# Patient Record
Sex: MALE | Race: BLACK OR AFRICAN AMERICAN | NOT HISPANIC OR LATINO | Employment: FULL TIME | URBAN - METROPOLITAN AREA
[De-identification: names, ages, dates, MRNs, and addresses within clinical notes are randomized per-mention and may not be internally consistent; named-entity substitution may affect disease eponyms.]

---

## 2020-06-07 ENCOUNTER — HOSPITAL ENCOUNTER (EMERGENCY)
Facility: HOSPITAL | Age: 30
Discharge: HOME | End: 2020-06-08
Attending: EMERGENCY MEDICINE
Payer: MEDICAID

## 2020-06-07 DIAGNOSIS — R79.89 ELEVATED SERUM CREATININE: ICD-10-CM

## 2020-06-07 DIAGNOSIS — R50.9 ACUTE FEBRILE ILLNESS: Primary | ICD-10-CM

## 2020-06-07 DIAGNOSIS — R19.7 DIARRHEA, UNSPECIFIED TYPE: ICD-10-CM

## 2020-06-07 DIAGNOSIS — R51.9 FRONTAL HEADACHE: ICD-10-CM

## 2020-06-07 LAB
ALBUMIN SERPL BCP-MCNC: 3.9 G/DL (ref 3.5–5.2)
ALP SERPL-CCNC: 72 U/L (ref 55–135)
ALT SERPL W/O P-5'-P-CCNC: 25 U/L (ref 10–44)
ANION GAP SERPL CALC-SCNC: 10 MMOL/L (ref 8–16)
AST SERPL-CCNC: 29 U/L (ref 10–40)
BASOPHILS # BLD AUTO: 0.03 K/UL (ref 0–0.2)
BASOPHILS NFR BLD: 0.3 % (ref 0–1.9)
BILIRUB SERPL-MCNC: 0.4 MG/DL (ref 0.1–1)
BILIRUB UR QL STRIP: NEGATIVE
BUN SERPL-MCNC: 17 MG/DL (ref 6–20)
CALCIUM SERPL-MCNC: 9.5 MG/DL (ref 8.7–10.5)
CHLORIDE SERPL-SCNC: 105 MMOL/L (ref 95–110)
CLARITY UR: CLEAR
CO2 SERPL-SCNC: 24 MMOL/L (ref 23–29)
COLOR UR: YELLOW
CREAT SERPL-MCNC: 1.6 MG/DL (ref 0.5–1.4)
CRP SERPL-MCNC: 50.9 MG/L (ref 0–8.2)
DIFFERENTIAL METHOD: ABNORMAL
EOSINOPHIL # BLD AUTO: 0.1 K/UL (ref 0–0.5)
EOSINOPHIL NFR BLD: 1 % (ref 0–8)
ERYTHROCYTE [DISTWIDTH] IN BLOOD BY AUTOMATED COUNT: 13.5 % (ref 11.5–14.5)
EST. GFR  (AFRICAN AMERICAN): >60 ML/MIN/1.73 M^2
EST. GFR  (NON AFRICAN AMERICAN): 58 ML/MIN/1.73 M^2
GLUCOSE SERPL-MCNC: 81 MG/DL (ref 70–110)
GLUCOSE UR QL STRIP: NEGATIVE
HCT VFR BLD AUTO: 48.3 % (ref 40–54)
HGB BLD-MCNC: 16.1 G/DL (ref 14–18)
HGB UR QL STRIP: ABNORMAL
IMM GRANULOCYTES # BLD AUTO: 0.03 K/UL (ref 0–0.04)
IMM GRANULOCYTES NFR BLD AUTO: 0.3 % (ref 0–0.5)
KETONES UR QL STRIP: NEGATIVE
LACTATE SERPL-SCNC: 1.8 MMOL/L (ref 0.5–2.2)
LEUKOCYTE ESTERASE UR QL STRIP: NEGATIVE
LYMPHOCYTES # BLD AUTO: 1.1 K/UL (ref 1–4.8)
LYMPHOCYTES NFR BLD: 11.6 % (ref 18–48)
MCH RBC QN AUTO: 29.2 PG (ref 27–31)
MCHC RBC AUTO-ENTMCNC: 33.3 G/DL (ref 32–36)
MCV RBC AUTO: 88 FL (ref 82–98)
MICROSCOPIC COMMENT: NORMAL
MONOCYTES # BLD AUTO: 0.7 K/UL (ref 0.3–1)
MONOCYTES NFR BLD: 7.4 % (ref 4–15)
NEUTROPHILS # BLD AUTO: 7.8 K/UL (ref 1.8–7.7)
NEUTROPHILS NFR BLD: 79.4 % (ref 38–73)
NITRITE UR QL STRIP: NEGATIVE
NRBC BLD-RTO: 0 /100 WBC
PH UR STRIP: 5 [PH] (ref 5–8)
PLATELET # BLD AUTO: 193 K/UL (ref 150–350)
PMV BLD AUTO: 10.2 FL (ref 9.2–12.9)
POTASSIUM SERPL-SCNC: 4.4 MMOL/L (ref 3.5–5.1)
PROCALCITONIN SERPL IA-MCNC: 0.17 NG/ML
PROT SERPL-MCNC: 7.5 G/DL (ref 6–8.4)
PROT UR QL STRIP: NEGATIVE
RBC # BLD AUTO: 5.51 M/UL (ref 4.6–6.2)
RBC #/AREA URNS HPF: 2 /HPF (ref 0–4)
SARS-COV-2 RDRP RESP QL NAA+PROBE: NEGATIVE
SODIUM SERPL-SCNC: 139 MMOL/L (ref 136–145)
SP GR UR STRIP: 1.02 (ref 1–1.03)
URN SPEC COLLECT METH UR: ABNORMAL
UROBILINOGEN UR STRIP-ACNC: NEGATIVE EU/DL
WBC # BLD AUTO: 9.85 K/UL (ref 3.9–12.7)
WBC #/AREA URNS HPF: 4 /HPF (ref 0–5)

## 2020-06-07 PROCEDURE — 87040 BLOOD CULTURE FOR BACTERIA: CPT

## 2020-06-07 PROCEDURE — 25000003 PHARM REV CODE 250: Performed by: PHYSICIAN ASSISTANT

## 2020-06-07 PROCEDURE — 81000 URINALYSIS NONAUTO W/SCOPE: CPT

## 2020-06-07 PROCEDURE — U0002 COVID-19 LAB TEST NON-CDC: HCPCS

## 2020-06-07 PROCEDURE — 80053 COMPREHEN METABOLIC PANEL: CPT

## 2020-06-07 PROCEDURE — 84145 PROCALCITONIN (PCT): CPT

## 2020-06-07 PROCEDURE — 93010 ELECTROCARDIOGRAM REPORT: CPT | Mod: ,,, | Performed by: INTERNAL MEDICINE

## 2020-06-07 PROCEDURE — 96361 HYDRATE IV INFUSION ADD-ON: CPT

## 2020-06-07 PROCEDURE — 99285 EMERGENCY DEPT VISIT HI MDM: CPT | Mod: 25

## 2020-06-07 PROCEDURE — 93005 ELECTROCARDIOGRAM TRACING: CPT

## 2020-06-07 PROCEDURE — 83605 ASSAY OF LACTIC ACID: CPT

## 2020-06-07 PROCEDURE — 86140 C-REACTIVE PROTEIN: CPT

## 2020-06-07 PROCEDURE — 93010 EKG 12-LEAD: ICD-10-PCS | Mod: ,,, | Performed by: INTERNAL MEDICINE

## 2020-06-07 PROCEDURE — 96360 HYDRATION IV INFUSION INIT: CPT

## 2020-06-07 PROCEDURE — 85025 COMPLETE CBC W/AUTO DIFF WBC: CPT

## 2020-06-07 RX ORDER — ACETAMINOPHEN 500 MG
1000 TABLET ORAL
Status: COMPLETED | OUTPATIENT
Start: 2020-06-07 | End: 2020-06-07

## 2020-06-07 RX ORDER — IBUPROFEN 600 MG/1
600 TABLET ORAL
Status: COMPLETED | OUTPATIENT
Start: 2020-06-07 | End: 2020-06-07

## 2020-06-07 RX ADMIN — SODIUM CHLORIDE 2817 ML: 0.9 INJECTION, SOLUTION INTRAVENOUS at 10:06

## 2020-06-07 RX ADMIN — IBUPROFEN 600 MG: 600 TABLET, FILM COATED ORAL at 09:06

## 2020-06-07 RX ADMIN — ACETAMINOPHEN 1000 MG: 500 TABLET ORAL at 11:06

## 2020-06-08 VITALS
BODY MASS INDEX: 28.98 KG/M2 | HEIGHT: 71 IN | RESPIRATION RATE: 25 BRPM | SYSTOLIC BLOOD PRESSURE: 137 MMHG | DIASTOLIC BLOOD PRESSURE: 82 MMHG | OXYGEN SATURATION: 100 % | WEIGHT: 207 LBS | TEMPERATURE: 101 F | HEART RATE: 105 BPM

## 2020-06-08 NOTE — ED PROVIDER NOTES
Encounter Date: 6/7/2020       History     Chief Complaint   Patient presents with    Fever     Oral temp in triage is 102.8. [place] fine on yesterday but reports a home temp of 104 today accompanied with chills. Took Tylenol and Ibuprofen for the fever. Last took Tylenol 30minutes PTA in ED. Denies cough, n/v or SOB.      29-year-old male with history of HIV (undetectable) presents to the emergency department for acute onset of fever and chills that began this morning.  Patient took Motrin earlier this morning and Tylenol 30 min prior to arrival with minimal improvement of symptoms.  He notes frontal headache and 1-2 episodes of diarrhea.  Patient is otherwise completely asymptomatic.  He specifically denies sore throat, nausea, vomiting, chest pain, shortness of breath, abdominal pain, urinary symptoms.  No sick contacts.  No exposure to COVID-19.  No recent hospitalization or use of antibiotics.  Compliant with antivirals.        Review of patient's allergies indicates:   Allergen Reactions    Bactrim [sulfamethoxazole-trimethoprim] Itching     No past medical history on file.  No past surgical history on file.  No family history on file.  Social History     Tobacco Use    Smoking status: Never Smoker    Smokeless tobacco: Never Used   Substance Use Topics    Alcohol use: No    Drug use: No     Review of Systems   Constitutional: Positive for chills and fever.   HENT: Negative for congestion, sore throat and trouble swallowing.    Respiratory: Negative for cough and shortness of breath.    Cardiovascular: Negative for chest pain.   Gastrointestinal: Positive for diarrhea. Negative for abdominal pain, constipation, nausea and vomiting.   Genitourinary: Negative for dysuria, flank pain, frequency and urgency.   Musculoskeletal: Negative for back pain.   Skin: Negative for rash.   Neurological: Positive for headaches (Mild frontal).   All other systems reviewed and are negative.      Physical Exam     Initial  Vitals [06/07/20 2056]   BP Pulse Resp Temp SpO2   (!) 140/77 (!) 128 20 (!) 102.8 °F (39.3 °C) 96 %      MAP       --         Physical Exam    Nursing note and vitals reviewed.  Constitutional: He appears well-developed and well-nourished. He is not diaphoretic. No distress.   HENT:   Head: Normocephalic and atraumatic.   Right Ear: Tympanic membrane, external ear and ear canal normal. No mastoid tenderness.   Left Ear: Tympanic membrane, external ear and ear canal normal. No mastoid tenderness.   Nose: Nose normal. No rhinorrhea. Right sinus exhibits no maxillary sinus tenderness and no frontal sinus tenderness. Left sinus exhibits no maxillary sinus tenderness and no frontal sinus tenderness.   Mouth/Throat: Uvula is midline and oropharynx is clear and moist. No oropharyngeal exudate, posterior oropharyngeal edema, posterior oropharyngeal erythema or tonsillar abscesses.   Eyes: Conjunctivae and EOM are normal. Pupils are equal, round, and reactive to light. Right eye exhibits no discharge. Left eye exhibits no discharge.   Neck: Normal range of motion. No tracheal deviation present. No JVD present.   Cardiovascular: Normal rate, regular rhythm and normal heart sounds. Exam reveals no friction rub.    No murmur heard.  Pulmonary/Chest: Breath sounds normal. No stridor. No respiratory distress. He has no wheezes. He has no rhonchi. He has no rales. He exhibits no tenderness.   Abdominal: Soft. He exhibits no distension. There is no tenderness. There is no rigidity, no rebound, no guarding, no CVA tenderness, no tenderness at McBurney's point and negative Mari's sign.   Musculoskeletal: Normal range of motion.   Lymphadenopathy:     He has no cervical adenopathy.   Neurological: He is alert and oriented to person, place, and time.   Skin: Skin is warm and dry. No rash and no abscess noted. No erythema. No pallor.         ED Course   Procedures  Labs Reviewed   CBC W/ AUTO DIFFERENTIAL - Abnormal; Notable for the  following components:       Result Value    Gran # (ANC) 7.8 (*)     Gran% 79.4 (*)     Lymph% 11.6 (*)     All other components within normal limits   COMPREHENSIVE METABOLIC PANEL - Abnormal; Notable for the following components:    Creatinine 1.6 (*)     eGFR if non  58 (*)     All other components within normal limits   URINALYSIS, REFLEX TO URINE CULTURE - Abnormal; Notable for the following components:    Occult Blood UA 1+ (*)     All other components within normal limits    Narrative:     Preferred Collection Type->Urine, Clean Catch   C-REACTIVE PROTEIN - Abnormal; Notable for the following components:    CRP 50.9 (*)     All other components within normal limits   CULTURE, BLOOD   CULTURE, BLOOD   SARS-COV-2 RNA AMPLIFICATION, QUAL   LACTIC ACID, PLASMA   PROCALCITONIN   URINALYSIS MICROSCOPIC    Narrative:     Preferred Collection Type->Urine, Clean Catch          Imaging Results          X-Ray Chest AP Portable (Final result)  Result time 06/07/20 22:18:50    Final result by Ann Valente MD (06/07/20 22:18:50)                 Impression:      No acute cardiopulmonary process identified.      Electronically signed by: Ann Valente MD  Date:    06/07/2020  Time:    22:18             Narrative:    EXAMINATION:  XR CHEST AP PORTABLE    CLINICAL HISTORY:  acute febrile illness. Hx HIV;    TECHNIQUE:  Single frontal view of the chest was performed.    COMPARISON:  None    FINDINGS:  Cardiac silhouette is normal in size.  Lungs are symmetrically expanded.  No evidence of focal consolidative process, pneumothorax, or significant pleural effusion.  No acute osseous abnormality identified.                                 Medical Decision Making:   History:   Old Medical Records: I decided to obtain old medical records.  Independently Interpreted Test(s):   I have ordered and independently interpreted X-rays - see prior notes.  I have ordered and independently interpreted EKG Reading(s) - see  prior notes  Clinical Tests:   Lab Tests: Ordered and Reviewed  Radiological Study: Ordered and Reviewed  Medical Tests: Ordered and Reviewed  ED Management:  Will obtains screening labs and imaging while treating symptoms and monitoring patient. No clear etiology of fever on exam at this time.      Patient reports improvement of symptoms.  He is now only reporting very mild frontal headache.  No evidence of meningeal signs.  Repeat abd exam remains benign.  He remains very well-appearing.  Remains afebrile.  Mild elevation of CRP and creatinine. No proteinuria.  Remainder of workup is completely unremarkable.  No leukocytosis.  No elevation of procalcitonin.  No pneumonia.  No UTI.  COVID-19 negative.  Given the absence of other symptoms/findings and only complaint is headache, I offer further investigation with LP, but patient declines.  Will continue to monitor.    Vitals continue to improve but are not completely normalized.  Patient continues to report improvement of symptoms with only symptom being a diminishing frontal headache.  Repeat abd exam remains benign.  Remains very well-appearing.  Etiology of symptoms is not entirely clear.  He may have very early stages of viral illness.  His HIV viral load is reported to be undetectable.     Will send home with very strict return precautions.  Discussed use of antipyretics at home.  I remain suspicious for COVID-19 and advise patient to quarantine at home and practice sanitary measures at home.  No respiratory distress or hypoxia. Advising follow-up with PCP.  Strict return precautions discussed with patient who is agreeable to the plan.  Other:   I have discussed this case with another health care provider.       <> Summary of the Discussion: Discussed with attending, Dr. Huddleston                                 Clinical Impression:       ICD-10-CM ICD-9-CM   1. Acute febrile illness R50.9 780.60   2. Frontal headache R51 784.0   3. Diarrhea, unspecified type  R19.7 787.91   4. Elevated serum creatinine R79.89 790.99             ED Disposition Condition    Discharge Stable        ED Prescriptions     None        Follow-up Information     Follow up With Specialties Details Why Contact Info    [facility]  Schedule an appointment as soon as possible for a visit in 1 day For reevaluation [address] [place] [address]  [phone]      [facility] Emergency [facility] Go to  If symptoms worsen [address] [place] [id number] [phone]                                     Wilber Pabol PA-C  06/08/20 0120

## 2020-06-11 LAB
BACTERIA BLD CULT: NORMAL
BACTERIA BLD CULT: NORMAL

## 2020-12-04 DIAGNOSIS — Z01.84 ANTIBODY RESPONSE EXAMINATION: ICD-10-CM

## 2022-12-12 ENCOUNTER — OFFICE VISIT (OUTPATIENT)
Dept: URGENT CARE | Facility: CLINIC | Age: 32
End: 2022-12-12

## 2022-12-12 VITALS
HEART RATE: 71 BPM | OXYGEN SATURATION: 98 % | DIASTOLIC BLOOD PRESSURE: 93 MMHG | SYSTOLIC BLOOD PRESSURE: 145 MMHG | TEMPERATURE: 99 F | HEIGHT: 71 IN | RESPIRATION RATE: 20 BRPM | WEIGHT: 207 LBS | BODY MASS INDEX: 28.98 KG/M2
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
## 2022-12-12 PROBLEM — Z21 ASYMPTOMATIC HIV INFECTION, WITH NO HISTORY OF HIV-RELATED ILLNESS: Status: ACTIVE | Noted: 2022-12-12

## 2022-12-12 PROCEDURE — 90715 TDAP VACCINE GREATER THAN OR EQUAL TO 7YO IM: ICD-10-PCS | Mod: S$GLB,,, | Performed by: FAMILY MEDICINE

## 2022-12-12 PROCEDURE — 90471 TDAP VACCINE GREATER THAN OR EQUAL TO 7YO IM: ICD-10-PCS | Mod: S$GLB,,, | Performed by: FAMILY MEDICINE

## 2022-12-12 PROCEDURE — 99213 OFFICE O/P EST LOW 20 MIN: CPT | Mod: S$GLB,,, | Performed by: FAMILY MEDICINE

## 2022-12-12 PROCEDURE — 90715 TDAP VACCINE 7 YRS/> IM: CPT | Mod: S$GLB,,, | Performed by: FAMILY MEDICINE

## 2022-12-12 PROCEDURE — 99213 PR OFFICE/OUTPT VISIT, EST, LEVL III, 20-29 MIN: ICD-10-PCS | Mod: S$GLB,,, | Performed by: FAMILY MEDICINE

## 2022-12-12 PROCEDURE — 90471 IMMUNIZATION ADMIN: CPT | Mod: S$GLB,,, | Performed by: FAMILY MEDICINE

## 2022-12-12 RX ORDER — TRIAMCINOLONE ACETONIDE 1 MG/G
CREAM TOPICAL
COMMUNITY
Start: 2022-11-07

## 2022-12-12 RX ORDER — AMOXICILLIN AND CLAVULANATE POTASSIUM 875; 125 MG/1; MG/1
1 TABLET, FILM COATED ORAL 2 TIMES DAILY
Qty: 20 TABLET | Refills: 0 | Status: SHIPPED | OUTPATIENT
Start: 2022-12-12 | End: 2022-12-22

## 2022-12-12 RX ORDER — AMOXICILLIN AND CLAVULANATE POTASSIUM 875; 125 MG/1; MG/1
1 TABLET, FILM COATED ORAL 2 TIMES DAILY
Qty: 20 TABLET | Refills: 0 | Status: SHIPPED | OUTPATIENT
Start: 2022-12-12 | End: 2022-12-12 | Stop reason: SDUPTHER

## 2022-12-12 RX ORDER — MUPIROCIN 20 MG/G
OINTMENT TOPICAL
Qty: 22 G | Refills: 1 | Status: SHIPPED | OUTPATIENT
Start: 2022-12-12

## 2022-12-12 NOTE — LETTER
Urgent Care - [facility]  [address] [address]  Phone: [phone]  Fax: [phone]  [facility]: [email or web address]    Pt Name: Guerrero Carmen  Injury Date: 12/12/2022   Employee ID:  Date of First Treatment: 12/12/2022   Company: [employer] reference to record [id number]      Appointment Time: 05:40 PM Arrived: 5:55   Provider: Bonnie Littlejohn DO Time Out:6:45     Office Treatment:   1. Dog bite, initial encounter      Medications Ordered This Encounter   Medications    amoxicillin-clavulanate 875-125mg (AUGMENTIN) 875-125 mg per tablet    mupirocin (BACTROBAN) 2 % ointment                 Return Appointment: per [facility] if necessary

## 2022-12-13 NOTE — PROGRESS NOTES
"Subjective:       Patient ID: Guerrero Carmen is a 32 y.o. male.    Vitals:  height is 5' 11" (1.803 m) and weight is 93.9 kg (207 lb). His temperature is 98.6 °F (37 °C). His blood pressure is 145/93 (abnormal) and his pulse is 71. His respiration is 20 and oxygen saturation is 98%.     Chief Complaint: Animal Bite    Pt presents with complaint of dog bite to left calf x3 hours.  Pt states EMS showed up and washed and cleaned the affected area.  Pt states he had a tetanus last around 10 years ago.  Pt states there is some swelling and tenderness about the area. Pt is a  for [employer]. He was bitten while on the job. He continued to work as he was a good distance away and had the truck. He drove home and presents now    Animal Bite   The incident occurred today. The incident occurred in the street. There is an injury to the Left lower leg. The pain is mild. It is unknown if a foreign body is present. There have been no prior injuries to these areas. He is Right-handed. His tetanus status is out of date. He has been Behaving normally. There were no sick contacts. Recently, medical care has been given by EMS.     Skin:  Positive for puncture wound and erythema.     Objective:      Physical Exam   Abdominal: Normal appearance.   Musculoskeletal: Normal range of motion.         General: Swelling, tenderness and signs of injury present. Normal range of motion.      Comments: Superficial abrasion of left lateral calf. Some surrounding erythema and swelling. No discharge   Neurological: He is alert.   Skin: Skin is warm and dry. erythema   Nursing note and vitals reviewed.      Assessment:       1. Dog bite, initial encounter          Plan:         Dog bite, initial encounter  -     (In Office Administered) Tdap Vaccine  -     amoxicillin-clavulanate 875-125mg (AUGMENTIN) 875-125 mg per tablet; Take 1 tablet by mouth 2 (two) times daily. for 10 days  Dispense: 20 tablet; Refill: 0  -     mupirocin (BACTROBAN) 2 % " ointment; Apply to affected area 3 times daily  Dispense: 22 g; Refill: 1  -     Ambulatory referral/consult to Occupational Medicine       Pt or guardian provided educational materials and instructions regarding their visit diagnosis.

## 2023-04-25 ENCOUNTER — HOSPITAL ENCOUNTER (EMERGENCY)
Facility: HOSPITAL | Age: 33
Discharge: HOME | End: 2023-04-25
Attending: EMERGENCY MEDICINE
Payer: MEDICAID

## 2023-04-25 VITALS
RESPIRATION RATE: 16 BRPM | SYSTOLIC BLOOD PRESSURE: 143 MMHG | HEIGHT: 72 IN | DIASTOLIC BLOOD PRESSURE: 97 MMHG | BODY MASS INDEX: 27.77 KG/M2 | WEIGHT: 205 LBS | OXYGEN SATURATION: 99 % | HEART RATE: 117 BPM | TEMPERATURE: 98 F

## 2023-04-25 DIAGNOSIS — R00.0 TACHYCARDIA: ICD-10-CM

## 2023-04-25 DIAGNOSIS — R00.2 PALPITATIONS: ICD-10-CM

## 2023-04-25 DIAGNOSIS — F41.9 ANXIETY: Primary | ICD-10-CM

## 2023-04-25 LAB
ALBUMIN SERPL BCP-MCNC: 4.6 G/DL (ref 3.5–5.2)
ALP SERPL-CCNC: 65 U/L (ref 55–135)
ALT SERPL W/O P-5'-P-CCNC: 44 U/L (ref 10–44)
ANION GAP SERPL CALC-SCNC: 10 MMOL/L (ref 8–16)
AST SERPL-CCNC: 45 U/L (ref 10–40)
BASOPHILS # BLD AUTO: 0.02 K/UL (ref 0–0.2)
BASOPHILS NFR BLD: 0.2 % (ref 0–1.9)
BILIRUB SERPL-MCNC: 0.6 MG/DL (ref 0.1–1)
BUN SERPL-MCNC: 14 MG/DL (ref 6–20)
CALCIUM SERPL-MCNC: 9.8 MG/DL (ref 8.7–10.5)
CHLORIDE SERPL-SCNC: 102 MMOL/L (ref 95–110)
CO2 SERPL-SCNC: 24 MMOL/L (ref 23–29)
CREAT SERPL-MCNC: 1.4 MG/DL (ref 0.5–1.4)
D DIMER PPP IA.FEU-MCNC: 0.25 MG/L FEU
DIFFERENTIAL METHOD: ABNORMAL
EOSINOPHIL # BLD AUTO: 0 K/UL (ref 0–0.5)
EOSINOPHIL NFR BLD: 0.1 % (ref 0–8)
ERYTHROCYTE [DISTWIDTH] IN BLOOD BY AUTOMATED COUNT: 12.8 % (ref 11.5–14.5)
EST. GFR  (NO RACE VARIABLE): >60 ML/MIN/1.73 M^2
GLUCOSE SERPL-MCNC: 119 MG/DL (ref 70–110)
HCT VFR BLD AUTO: 49.3 % (ref 40–54)
HGB BLD-MCNC: 17 G/DL (ref 14–18)
IMM GRANULOCYTES # BLD AUTO: 0.03 K/UL (ref 0–0.04)
IMM GRANULOCYTES NFR BLD AUTO: 0.3 % (ref 0–0.5)
INR PPP: 1 (ref 0.8–1.2)
LYMPHOCYTES # BLD AUTO: 1.3 K/UL (ref 1–4.8)
LYMPHOCYTES NFR BLD: 13.2 % (ref 18–48)
MCH RBC QN AUTO: 29.4 PG (ref 27–31)
MCHC RBC AUTO-ENTMCNC: 34.5 G/DL (ref 32–36)
MCV RBC AUTO: 85 FL (ref 82–98)
MONOCYTES # BLD AUTO: 0.5 K/UL (ref 0.3–1)
MONOCYTES NFR BLD: 5 % (ref 4–15)
NEUTROPHILS # BLD AUTO: 8 K/UL (ref 1.8–7.7)
NEUTROPHILS NFR BLD: 81.2 % (ref 38–73)
NRBC BLD-RTO: 0 /100 WBC
PLATELET # BLD AUTO: 238 K/UL (ref 150–450)
PMV BLD AUTO: 9.2 FL (ref 9.2–12.9)
POTASSIUM SERPL-SCNC: 4.8 MMOL/L (ref 3.5–5.1)
PROT SERPL-MCNC: 8.6 G/DL (ref 6–8.4)
PROTHROMBIN TIME: 10.9 SEC (ref 9–12.5)
RBC # BLD AUTO: 5.78 M/UL (ref 4.6–6.2)
SODIUM SERPL-SCNC: 136 MMOL/L (ref 136–145)
TROPONIN I SERPL DL<=0.01 NG/ML-MCNC: <0.006 NG/ML (ref 0–0.03)
WBC # BLD AUTO: 9.8 K/UL (ref 3.9–12.7)

## 2023-04-25 PROCEDURE — 93010 ELECTROCARDIOGRAM REPORT: CPT | Mod: ,,, | Performed by: INTERNAL MEDICINE

## 2023-04-25 PROCEDURE — 84484 ASSAY OF TROPONIN QUANT: CPT

## 2023-04-25 PROCEDURE — 93010 EKG 12-LEAD: ICD-10-PCS | Mod: ,,, | Performed by: INTERNAL MEDICINE

## 2023-04-25 PROCEDURE — 85379 FIBRIN DEGRADATION QUANT: CPT

## 2023-04-25 PROCEDURE — 99285 EMERGENCY DEPT VISIT HI MDM: CPT | Mod: 25

## 2023-04-25 PROCEDURE — 85025 COMPLETE CBC W/AUTO DIFF WBC: CPT

## 2023-04-25 PROCEDURE — 93005 ELECTROCARDIOGRAM TRACING: CPT

## 2023-04-25 PROCEDURE — 85610 PROTHROMBIN TIME: CPT

## 2023-04-25 PROCEDURE — 80053 COMPREHEN METABOLIC PANEL: CPT

## 2023-04-25 NOTE — ED PROVIDER NOTES
Encounter Date: 4/25/2023       History     Chief Complaint   Patient presents with    Palpitations     Pt reports heart started racing about 1 hr ago. 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 Pt reports increasing anxiety since beginning the class he is studying before but notes today feels different. There was no sob, chest pain, nausea, vomiting, diaphoresis with onset of discomfort. He currently denies pain. He also denies recent travel or surgery and recent illness or known sick contacts.     The history is provided by the patient. No  was used.   Review of patient's allergies indicates:   Allergen Reactions    Bactrim [sulfamethoxazole-trimethoprim] Itching    Doxycycline Nausea And Vomiting     History reviewed. No pertinent past medical history.  History reviewed. No pertinent surgical history.  History reviewed. No pertinent family history.  Social History     Tobacco Use    Smoking status: Never    Smokeless tobacco: Never   Substance Use Topics    Alcohol use: No    Drug use: No     Review of Systems   Constitutional:  Negative for chills, diaphoresis and fever.   HENT:  Negative for sore throat.    Respiratory:  Negative for cough and shortness of breath.    Cardiovascular:  Positive for palpitations. Negative for chest pain and leg swelling.   Gastrointestinal:  Negative for abdominal pain, blood in stool, diarrhea, nausea and vomiting.   Genitourinary:  Negative for dysuria.   Musculoskeletal:  Negative for back pain.   Skin:  Negative for rash.   Neurological:  Negative for dizziness, weakness and headaches.   Hematological:  Does not bruise/bleed easily.     Physical Exam     Initial Vitals   BP Pulse Resp Temp SpO2   04/25/23 1745 04/25/23 1741 04/25/23 1745 04/25/23 1745  04/25/23 1741   (!) 143/97 (!) 112 16 98.2 °F (36.8 °C) 97 %      MAP       --                Physical Exam    Nursing note and vitals reviewed.  Constitutional: He appears well-developed and well-nourished. He is not diaphoretic. No distress.   HENT:   Head: Normocephalic and atraumatic.   Eyes: EOM are normal.   Neck:   Normal range of motion.  Cardiovascular:  Normal heart sounds and intact distal pulses.   Tachycardia present.         Pulses:       Radial pulses are 2+ on the right side and 2+ on the left side.        Dorsalis pedis pulses are 2+ on the right side and 2+ on the left side.   Pulmonary/Chest: Breath sounds normal. No respiratory distress.   Abdominal: Abdomen is soft. There is no abdominal tenderness.   Musculoskeletal:      Cervical back: Normal range of motion.      Comments: No calf tenderness, erythema, or edema     Neurological: He is alert.   Skin: Skin is warm and dry. No rash noted. No erythema. No pallor.   Psychiatric: He has a normal mood and affect. His behavior is normal. Judgment and thought content normal.       ED Course   Procedures  Labs Reviewed   CBC W/ AUTO DIFFERENTIAL - Abnormal; Notable for the following components:       Result Value    Gran # (ANC) 8.0 (*)     Gran % 81.2 (*)     Lymph % 13.2 (*)     All other components within normal limits   COMPREHENSIVE METABOLIC PANEL - Abnormal; Notable for the following components:    Glucose 119 (*)     Total Protein 8.6 (*)     AST 45 (*)     All other components within normal limits   D DIMER, QUANTITATIVE   TROPONIN I   PROTIME-INR     EKG Readings: (Independently Interpreted)   Sinus tachycardia with rate in 110s. Narrow qrs, no st elevation. Appears unchanged from previous     Imaging Results              X-Ray Chest AP Portable (Final result)  Result time 04/25/23 19:00:40      Final result by Ernie Ramirez MD (04/25/23 19:00:40)                   Impression:      No acute process.      Electronically signed by: Ernie Ramirez  MD  Date:    04/25/2023  Time:    19:00               Narrative:    EXAMINATION:  XR CHEST AP PORTABLE    CLINICAL HISTORY:  Tachycardia, unspecified    TECHNIQUE:  Single frontal view of the chest was performed.    COMPARISON:  06/07/2020.    FINDINGS:  The trachea is unremarkable.  The cardiomediastinal silhouette is within normal limits.  The hilar structures are unremarkable.  There is no evidence of free air beneath the hemidiaphragms.  There are no pleural effusions.  There is no evidence of a pneumothorax.  There is no evidence of pneumomediastinum.  No airspace opacity is present.  The osseous structures are unremarkable.                                       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 Pt reports increasing anxiety since beginning the class he is studying before but notes today feels different. There was no sob, chest pain, nausea, vomiting, diaphoresis with onset of discomfort. He currently denies pain. He also denies recent travel or surgery and recent illness or known sick contacts. On exam he is resting comfortably, in no apparent distress, and speaking in full sentences without becoming sob. He is tachycardic at 117, /97, afebrile, and 99% on RA. Lungs clear to auscultation bilaterally, no abdominal ttp, radial and dorsalis pedis pulses intact and equal. No calf tenderness, erythema, warmth, or edema.   Differential Diagnosis:   This is likely anxiety   PE considered with PERC score of 1 but d-dimer wnl.  Aortic dissection considered but no chest pain, sob, distal pulses intact and equal  Pneumothorax considered but lungs clear to auscultation bilaterally, 99% on ra and no acute findings on CXR; results above.   ACS considered but no chest pain  or sob, troponin wnl, and no st elevation on ekg.     ED Management:   prior to discharge.   Return precautions discussed, including but not limited to, chest pain, sob, fever, chills, nausea, vomiting, calf pain and swelling.    Additional MDM:   PERC Rule:   Age is greater than or equal to 50 = 0.0  Heart Rate is greater than or equal to 100 = 1.0  SaO2 on room air < 95% = 0.0  Unilateral leg swelling = 0.0  Hemoptysis = 0.0  Recent surgery or trauma = 0.0  Prior PE or DVT =  0.0  Hormone use = 0.00  PERC Score = 1                     Clinical Impression:   Final diagnoses:  [R00.2] Palpitations  [R00.0] Tachycardia  [F41.9] Anxiety (Primary)        ED Disposition Condition    Discharge Stable          ED Prescriptions    None       Follow-up Information    None          Franci Arias PA-C  04/26/23 0255

## 2023-04-26 NOTE — DISCHARGE INSTRUCTIONS

## 2024-02-28 ENCOUNTER — HOSPITAL ENCOUNTER (EMERGENCY)
Facility: HOSPITAL | Age: 34
Discharge: HOME | End: 2024-02-29
Attending: EMERGENCY MEDICINE

## 2024-02-28 DIAGNOSIS — R00.2 PALPITATIONS: Primary | ICD-10-CM

## 2024-02-28 DIAGNOSIS — R00.0 TACHYCARDIA: ICD-10-CM

## 2024-02-28 DIAGNOSIS — T43.615A ADVERSE EFFECT OF CAFFEINE, INITIAL ENCOUNTER: ICD-10-CM

## 2024-02-28 LAB
ALBUMIN SERPL BCP-MCNC: 4.3 G/DL (ref 3.5–5.2)
ALLENS TEST: NORMAL
ALP SERPL-CCNC: 64 U/L (ref 55–135)
ALT SERPL W/O P-5'-P-CCNC: 18 U/L (ref 10–44)
ANION GAP SERPL CALC-SCNC: 10 MMOL/L (ref 8–16)
ANION GAP SERPL CALC-SCNC: 16 MMOL/L (ref 8–16)
AST SERPL-CCNC: 27 U/L (ref 10–40)
BASOPHILS # BLD AUTO: 0.01 K/UL (ref 0–0.2)
BASOPHILS NFR BLD: 0.1 % (ref 0–1.9)
BILIRUB SERPL-MCNC: 0.5 MG/DL (ref 0.1–1)
BNP SERPL-MCNC: <10 PG/ML (ref 0–99)
BUN SERPL-MCNC: 12 MG/DL (ref 6–30)
BUN SERPL-MCNC: 13 MG/DL (ref 6–20)
CALCIUM SERPL-MCNC: 9.6 MG/DL (ref 8.7–10.5)
CHLORIDE SERPL-SCNC: 104 MMOL/L (ref 95–110)
CHLORIDE SERPL-SCNC: 104 MMOL/L (ref 95–110)
CO2 SERPL-SCNC: 25 MMOL/L (ref 23–29)
CREAT SERPL-MCNC: 1.2 MG/DL (ref 0.5–1.4)
CREAT SERPL-MCNC: 1.3 MG/DL (ref 0.5–1.4)
D DIMER PPP IA.FEU-MCNC: 0.32 MG/L FEU
DELSYS: NORMAL
DIFFERENTIAL METHOD BLD: NORMAL
EOSINOPHIL # BLD AUTO: 0 K/UL (ref 0–0.5)
EOSINOPHIL NFR BLD: 0.1 % (ref 0–8)
ERYTHROCYTE [DISTWIDTH] IN BLOOD BY AUTOMATED COUNT: 12.8 % (ref 11.5–14.5)
EST. GFR  (NO RACE VARIABLE): >60 ML/MIN/1.73 M^2
FIO2: 21
GLUCOSE SERPL-MCNC: 101 MG/DL (ref 70–110)
GLUCOSE SERPL-MCNC: 103 MG/DL (ref 70–110)
HCT VFR BLD AUTO: 48.6 % (ref 40–54)
HCT VFR BLD CALC: 47 %PCV (ref 36–54)
HGB BLD-MCNC: 16.6 G/DL (ref 14–18)
IMM GRANULOCYTES # BLD AUTO: 0.02 K/UL (ref 0–0.04)
IMM GRANULOCYTES NFR BLD AUTO: 0.2 % (ref 0–0.5)
LYMPHOCYTES # BLD AUTO: 3.4 K/UL (ref 1–4.8)
LYMPHOCYTES NFR BLD: 36.2 % (ref 18–48)
MAGNESIUM SERPL-MCNC: 2.1 MG/DL (ref 1.6–2.6)
MCH RBC QN AUTO: 28.7 PG (ref 27–31)
MCHC RBC AUTO-ENTMCNC: 34.2 G/DL (ref 32–36)
MCV RBC AUTO: 84 FL (ref 82–98)
MODE: NORMAL
MONOCYTES # BLD AUTO: 0.6 K/UL (ref 0.3–1)
MONOCYTES NFR BLD: 6.4 % (ref 4–15)
NEUTROPHILS # BLD AUTO: 5.4 K/UL (ref 1.8–7.7)
NEUTROPHILS NFR BLD: 57 % (ref 38–73)
NRBC BLD-RTO: 0 /100 WBC
PLATELET # BLD AUTO: 238 K/UL (ref 150–450)
PMV BLD AUTO: 9.6 FL (ref 9.2–12.9)
POC IONIZED CALCIUM: 1.18 MMOL/L (ref 1.06–1.42)
POC TCO2 (MEASURED): 27 MMOL/L (ref 23–29)
POTASSIUM BLD-SCNC: 3.6 MMOL/L (ref 3.5–5.1)
POTASSIUM SERPL-SCNC: 4 MMOL/L (ref 3.5–5.1)
PROT SERPL-MCNC: 8.1 G/DL (ref 6–8.4)
RBC # BLD AUTO: 5.79 M/UL (ref 4.6–6.2)
SAMPLE: NORMAL
SITE: NORMAL
SODIUM BLD-SCNC: 142 MMOL/L (ref 136–145)
SODIUM SERPL-SCNC: 139 MMOL/L (ref 136–145)
SP02: 98
TROPONIN I SERPL DL<=0.01 NG/ML-MCNC: <0.006 NG/ML (ref 0–0.03)
TSH SERPL DL<=0.005 MIU/L-ACNC: 1.63 UIU/ML (ref 0.4–4)
WBC # BLD AUTO: 9.48 K/UL (ref 3.9–12.7)

## 2024-02-28 PROCEDURE — 85014 HEMATOCRIT: CPT

## 2024-02-28 PROCEDURE — 83880 ASSAY OF NATRIURETIC PEPTIDE: CPT | Performed by: EMERGENCY MEDICINE

## 2024-02-28 PROCEDURE — 85379 FIBRIN DEGRADATION QUANT: CPT | Performed by: EMERGENCY MEDICINE

## 2024-02-28 PROCEDURE — 84132 ASSAY OF SERUM POTASSIUM: CPT

## 2024-02-28 PROCEDURE — 84484 ASSAY OF TROPONIN QUANT: CPT | Performed by: EMERGENCY MEDICINE

## 2024-02-28 PROCEDURE — 25000003 PHARM REV CODE 250: Performed by: EMERGENCY MEDICINE

## 2024-02-28 PROCEDURE — 93010 ELECTROCARDIOGRAM REPORT: CPT | Mod: ,,, | Performed by: INTERNAL MEDICINE

## 2024-02-28 PROCEDURE — 99900035 HC TECH TIME PER 15 MIN (STAT)

## 2024-02-28 PROCEDURE — 82565 ASSAY OF CREATININE: CPT

## 2024-02-28 PROCEDURE — 85025 COMPLETE CBC W/AUTO DIFF WBC: CPT | Performed by: EMERGENCY MEDICINE

## 2024-02-28 PROCEDURE — 84295 ASSAY OF SERUM SODIUM: CPT

## 2024-02-28 PROCEDURE — 83735 ASSAY OF MAGNESIUM: CPT | Performed by: EMERGENCY MEDICINE

## 2024-02-28 PROCEDURE — 99285 EMERGENCY DEPT VISIT HI MDM: CPT | Mod: 25

## 2024-02-28 PROCEDURE — 80053 COMPREHEN METABOLIC PANEL: CPT | Performed by: EMERGENCY MEDICINE

## 2024-02-28 PROCEDURE — 93005 ELECTROCARDIOGRAM TRACING: CPT

## 2024-02-28 PROCEDURE — 84443 ASSAY THYROID STIM HORMONE: CPT | Performed by: EMERGENCY MEDICINE

## 2024-02-28 PROCEDURE — 82330 ASSAY OF CALCIUM: CPT

## 2024-02-28 RX ADMIN — SODIUM CHLORIDE 1000 ML: 9 INJECTION, SOLUTION INTRAVENOUS at 11:02

## 2024-02-29 VITALS
RESPIRATION RATE: 20 BRPM | WEIGHT: 215 LBS | HEIGHT: 72 IN | OXYGEN SATURATION: 98 % | DIASTOLIC BLOOD PRESSURE: 84 MMHG | HEART RATE: 96 BPM | SYSTOLIC BLOOD PRESSURE: 148 MMHG | BODY MASS INDEX: 29.12 KG/M2 | TEMPERATURE: 99 F

## 2024-02-29 LAB
OHS QRS DURATION: 102 MS
OHS QRS DURATION: 94 MS
OHS QTC CALCULATION: 416 MS
OHS QTC CALCULATION: 448 MS

## 2024-02-29 PROCEDURE — 93010 ELECTROCARDIOGRAM REPORT: CPT | Mod: ,,, | Performed by: INTERNAL MEDICINE

## 2024-02-29 PROCEDURE — 93005 ELECTROCARDIOGRAM TRACING: CPT

## 2024-02-29 NOTE — ED NOTES
Pt presents to the ED today for complaints of palpitations and shortness of breath. Pt reports episode was triggered when he consumed an energy drink prior to an exam, pt reports that shortly after taking the exam he went out for a run and felt short of breath. On arrival to ED bed, pt denies sob, cp, dizziness or any pain/discomfort. Pt is alert and oriented, vss, appears in no acute distress at this time.

## 2024-02-29 NOTE — ED PROVIDER NOTES
"Encounter Date: 2/28/2024       History     Chief Complaint   Patient presents with    Palpitations     Pt to ED via POV with complaints of palpitations. States drank an energy drink prior to taking an exam at approx 1PM. Pt states he then went on a run after the exam and started feeling the palpitations. Pt states he is not having any chest pain but is having SOB. Pt denies any cardiac hx. Pt states "had EKG last week due to having anxiety prior to final." Pt in NAD.      34 yo male with HIV on HAART, undetectable, presents via personal transportation to [facility] ER status post acute episode of palpitations.  Patient was in his usual state of health yesterday and earlier today.  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  Patient has had this energy drink before, but states he drank it faster today.  He felt okay during his exam.  Afterwards, he went for a run.  About 5 minutes into his run, patient felt pounding in the middle of his chest.  He slowed down, and the pounding resolved.  However, after he had been walking about 8 minutes, patient had another episode of pounding in his chest associated with anxiety.  This lasted maybe 5 minutes.  No chest pain.  No shortness of breath.  No nausea or vomiting.  Patient still feels like his heart is beating faster than normal.  No recent travel or immobilization.  No family history of early-onset heart disease.  Patient states he runs regularly and does not have any issues with exercise tolerance.        Review of patient's allergies indicates:   Allergen Reactions    Bactrim [sulfamethoxazole-trimethoprim] Itching    Doxycycline Nausea And Vomiting     No past medical history on file.  No past surgical history on file.  No family history on file.  Social History     Tobacco Use    Smoking status: Never    Smokeless tobacco: Never   Substance Use Topics    Alcohol use: No    Drug use: No     Review of Systems "   Constitutional:  Negative for diaphoresis, fatigue and fever.   HENT:  Negative for rhinorrhea.    Eyes:  Negative for photophobia.   Respiratory:  Negative for shortness of breath.    Cardiovascular:  Positive for palpitations. Negative for chest pain.   Gastrointestinal:  Negative for nausea and vomiting.   Genitourinary:  Negative for dysuria.   Musculoskeletal:  Negative for gait problem.   Skin:  Negative for rash.   Neurological:  Negative for syncope and light-headedness.       Physical Exam     Initial Vitals [02/28/24 1751]   BP Pulse Resp Temp SpO2   (!) 175/96 108 18 98.5 °F (36.9 °C) 99 %      MAP       --         Physical Exam    Nursing note and vitals reviewed.  Constitutional: He appears well-developed and well-nourished. He is not diaphoretic.   Awake, alert, nontoxic, speaking in complete sentences without shortness of breath.   HENT:   Head: Normocephalic and atraumatic.   Mouth/Throat: Oropharynx is clear and moist.   Eyes: Conjunctivae and EOM are normal. Pupils are equal, round, and reactive to light.   Neck: Neck supple.   Normal range of motion.  Cardiovascular:  Normal rate, regular rhythm, normal heart sounds and intact distal pulses.           No murmur heard.  Pulmonary/Chest: Breath sounds normal. No respiratory distress. He has no wheezes. He has no rhonchi. He has no rales.   Abdominal: Abdomen is soft. There is no abdominal tenderness.   Musculoskeletal:         General: No tenderness or edema. Normal range of motion.      Cervical back: Normal range of motion and neck supple.     Neurological: He is alert and oriented to person, place, and time. He has normal strength.   Moving all extremities   Skin: Skin is warm and dry.   Psychiatric: He has a normal mood and affect.         ED Course   Procedures  Labs Reviewed   COMPREHENSIVE METABOLIC PANEL   CBC W/ AUTO DIFFERENTIAL   B-TYPE NATRIURETIC PEPTIDE   D DIMER, QUANTITATIVE   TROPONIN I   MAGNESIUM   TSH   ISTAT PROCEDURE     EKG  Readings: (Independently Interpreted)   17:55: Sinus tach, .  Normal axis.  No ectopy.  Incomplete RBBB.  No STEMI.    00:32: Sinus tach, .  Normal axis.  No ectopy.  Incomplete RBBB.  No STEMI.         Imaging Results              X-Ray Chest AP Portable (Final result)  Result time 02/28/24 22:10:08      Final result by Margret Rodriguez MD (02/28/24 22:10:08)                   Impression:      No acute intrathoracic abnormality detected.      Electronically signed by: Margret Rodriguez  Date:    02/28/2024  Time:    22:10               Narrative:    EXAMINATION:  AP PORTABLE CHEST    CLINICAL HISTORY:  palpitations;    TECHNIQUE:  AP portable chest radiograph was submitted.    COMPARISON:  02/28/2024 18:04    FINDINGS:  AP portable chest radiograph demonstrates a cardiac silhouette within normal limits.  There is no focal consolidation, pneumothorax, or pleural effusion.                                        X-Ray Chest PA And Lateral (Final result)  Result time 02/28/24 19:39:33      Final result by Cornelius Ogden MD (02/28/24 19:39:33)                   Impression:      Deeply inflated lungs, likely representing an unusually good inspiratory result.  Bronchospasm could have a similar radiographic appearance.  Correlate clinically.    Air-fluid levels within nondilated left upper quadrant small bowel, etiology uncertain, but ileus is favored.  Correlate clinically.  Consider abdominal imaging if clinically appropriate.    This report was flagged in Epic as abnormal.      Electronically signed by: Cornelius Ogden  Date:    02/28/2024  Time:    19:39               Narrative:    EXAMINATION:  XR CHEST PA AND LATERAL    CLINICAL HISTORY:  Palpitations    TECHNIQUE:  PA and lateral views of the chest were performed.    COMPARISON:  Portable AP chest x-ray 04/25/2023 and 06/07/2020    FINDINGS:  Midline thoracic trachea.    Nonenlarged cardiomediastinal silhouette.    The lungs are deeply inflated.    No  consolidation, pulmonary edema, pulmonary mass, pneumothorax, pleural fluid, acute skeletal abnormality is apparent radiographically.    In the visualized portion of the upper abdomen there are some air-fluid levels within nondilated left upper quadrant small bowel.                                       Medications   sodium chloride 0.9% bolus 1,000 mL 1,000 mL (1,000 mLs Intravenous New Bag 2/28/24 6076)     Medical Decision Making  33 y.o. male with palpitations after consuming energy drink and going for a run.    Ddx includes adverse reaction to caffeine, dysrhythmia, ACS, symptomatic anemia, PE, other.    EKG no STEMI.  Incomplete RBBB.      Labs reassuring.  Negative troponin drawn at 21:30 when episode occurred around 16:00 rules out ACS.  Negative ddimer reassuring for no PE and no dissection.    Patient received IV NS 1 L bolus with improvement in palpitations, though his heart rate still remained borderline elevated.  I have advised patient to drink ample non caffeinated non alcoholic fluids when he gets home.  I have also advised him to avoid energy drinks or excessive caffeine in the future.  He feels comfortable going home, and voiced understanding of instructions.    Amount and/or Complexity of Data Reviewed  Labs: ordered.  Radiology: ordered.                                      Clinical Impression:  Final diagnoses:  [R00.0] Tachycardia  [R00.2] Palpitations (Primary)  [T43.615A] Adverse effect of caffeine, initial encounter          ED Disposition Condition    Discharge Stable          ED Prescriptions    None       Follow-up Information    None          Hui Short MD  02/28/24 [id number]       Hui Short MD  02/29/24 0913

## 2024-02-29 NOTE — DISCHARGE INSTRUCTIONS
"Avoid excess consumption of caffeine or other "energy" beverages, as these may cause your heart to race.  Drink plenty of water and other nonalcoholic decaffeinated beverages.    "